# Patient Record
Sex: FEMALE | Race: WHITE
[De-identification: names, ages, dates, MRNs, and addresses within clinical notes are randomized per-mention and may not be internally consistent; named-entity substitution may affect disease eponyms.]

---

## 2021-01-29 ENCOUNTER — HOSPITAL ENCOUNTER (OUTPATIENT)
Dept: HOSPITAL 63 - DXRAD | Age: 51
End: 2021-01-29
Attending: FAMILY MEDICINE
Payer: COMMERCIAL

## 2021-01-29 DIAGNOSIS — M16.12: ICD-10-CM

## 2021-01-29 DIAGNOSIS — R05: Primary | ICD-10-CM

## 2021-01-29 DIAGNOSIS — R10.32: ICD-10-CM

## 2021-01-29 PROCEDURE — 74019 RADEX ABDOMEN 2 VIEWS: CPT

## 2021-01-29 PROCEDURE — 71046 X-RAY EXAM CHEST 2 VIEWS: CPT

## 2021-01-30 NOTE — RAD
Chest PA and lateral:



Reason for examination: Short of breath. Abdominal pain.



The heart size is normal. Mediastinum is unremarkable. Lung fields show 1.5 cm nodule in the right lo
wer lobe. No other infiltrates or pleural effusions are seen. No acute bony abnormality is seen.



IMPRESSION:



1.5 cm nodule in the right lower lobe.





Abdomen supine and upright:



There is no gross organomegaly. Psoas muscles are symmetric. Bowel gas pattern is nonspecific and non
obstructive. No abnormal calcifications are seen. There are severe degenerative changes at the left h
ip. No acute bony abnormalities are seen.



IMPRESSION:



Nonspecific nonobstructive bowel gas pattern.

Severe degenerative changes at the left hip.



Electronically signed by: Kimberley Rico MD (1/30/2021 8:01 AM) [email or web address]